# Patient Record
Sex: FEMALE | Race: BLACK OR AFRICAN AMERICAN | NOT HISPANIC OR LATINO | Employment: OTHER | URBAN - METROPOLITAN AREA
[De-identification: names, ages, dates, MRNs, and addresses within clinical notes are randomized per-mention and may not be internally consistent; named-entity substitution may affect disease eponyms.]

---

## 2020-08-03 ENCOUNTER — TELEPHONE (OUTPATIENT)
Dept: PRIMARY CARE CLINIC | Facility: CLINIC | Age: 65
End: 2020-08-03

## 2020-08-03 NOTE — TELEPHONE ENCOUNTER
----- Message from Astrid Jo sent at 8/3/2020  1:00 PM CDT -----  Type:  Needs Medical Advice    Who Called:  Pt  Olivia   Symptoms (please be specific):   Pt is a newpt to Dr Fox/she would like to est care//would like to know being she is a new pt can she have a virtual appt  How long has patient had these symptoms:    Pharmacy name and phone #:    Would the patient rather a call back or a response via [facility]?  Call back  Best Call Back Number:  [phone]  Additional Information:  please call to inform//thanks//dangelo

## 2020-09-01 ENCOUNTER — OFFICE VISIT (OUTPATIENT)
Dept: PRIMARY CARE CLINIC | Facility: CLINIC | Age: 65
End: 2020-09-01
Payer: MEDICARE

## 2020-09-01 ENCOUNTER — HOSPITAL ENCOUNTER (OUTPATIENT)
Dept: RADIOLOGY | Facility: HOSPITAL | Age: 65
Discharge: HOME | End: 2020-09-01
Attending: FAMILY MEDICINE
Payer: MEDICARE

## 2020-09-01 VITALS
TEMPERATURE: 98 F | DIASTOLIC BLOOD PRESSURE: 70 MMHG | BODY MASS INDEX: 57.38 KG/M2 | SYSTOLIC BLOOD PRESSURE: 160 MMHG | OXYGEN SATURATION: 96 % | HEART RATE: 79 BPM | WEIGHT: 293 LBS

## 2020-09-01 DIAGNOSIS — M79.602 LEFT ARM PAIN: ICD-10-CM

## 2020-09-01 DIAGNOSIS — R06.02 SOB (SHORTNESS OF BREATH) ON EXERTION: Primary | ICD-10-CM

## 2020-09-01 DIAGNOSIS — Z86.73 HISTORY OF CVA (CEREBROVASCULAR ACCIDENT): ICD-10-CM

## 2020-09-01 DIAGNOSIS — M25.561 CHRONIC PAIN OF BOTH KNEES: ICD-10-CM

## 2020-09-01 DIAGNOSIS — Z85.41 HISTORY OF CERVICAL CANCER: ICD-10-CM

## 2020-09-01 DIAGNOSIS — M51.36 DDD (DEGENERATIVE DISC DISEASE), LUMBAR: ICD-10-CM

## 2020-09-01 DIAGNOSIS — F43.21 GRIEF REACTION: ICD-10-CM

## 2020-09-01 DIAGNOSIS — M25.562 CHRONIC PAIN OF BOTH KNEES: ICD-10-CM

## 2020-09-01 DIAGNOSIS — G47.00 INSOMNIA, UNSPECIFIED TYPE: ICD-10-CM

## 2020-09-01 DIAGNOSIS — E55.9 VITAMIN D DEFICIENCY: ICD-10-CM

## 2020-09-01 DIAGNOSIS — I10 HTN (HYPERTENSION), BENIGN: ICD-10-CM

## 2020-09-01 DIAGNOSIS — G89.29 CHRONIC PAIN OF BOTH KNEES: ICD-10-CM

## 2020-09-01 DIAGNOSIS — R73.09 ABNORMAL GLUCOSE: ICD-10-CM

## 2020-09-01 DIAGNOSIS — J44.9 CHRONIC OBSTRUCTIVE PULMONARY DISEASE, UNSPECIFIED COPD TYPE: ICD-10-CM

## 2020-09-01 DIAGNOSIS — E66.01 MORBIDLY OBESE: ICD-10-CM

## 2020-09-01 PROBLEM — R09.02 HYPOXIA: Status: ACTIVE | Noted: 2018-09-21

## 2020-09-01 PROBLEM — M62.82 NON-TRAUMATIC RHABDOMYOLYSIS: Status: ACTIVE | Noted: 2018-09-21

## 2020-09-01 PROCEDURE — 73090 XR FOREARM LEFT: ICD-10-PCS | Mod: 26,HCNC,LT, | Performed by: RADIOLOGY

## 2020-09-01 PROCEDURE — 3078F PR MOST RECENT DIASTOLIC BLOOD PRESSURE < 80 MM HG: ICD-10-PCS | Mod: HCNC,CPTII,S$GLB, | Performed by: FAMILY MEDICINE

## 2020-09-01 PROCEDURE — 3008F PR BODY MASS INDEX (BMI) DOCUMENTED: ICD-10-PCS | Mod: HCNC,CPTII,S$GLB, | Performed by: FAMILY MEDICINE

## 2020-09-01 PROCEDURE — 99999 PR PBB SHADOW E&M-EST. PATIENT-LVL V: ICD-10-PCS | Mod: PBBFAC,HCNC,, | Performed by: FAMILY MEDICINE

## 2020-09-01 PROCEDURE — 73090 X-RAY EXAM OF FOREARM: CPT | Mod: TC,HCNC,FY,PO,LT

## 2020-09-01 PROCEDURE — 99999 PR PBB SHADOW E&M-EST. PATIENT-LVL V: CPT | Mod: PBBFAC,HCNC,, | Performed by: FAMILY MEDICINE

## 2020-09-01 PROCEDURE — 3078F DIAST BP <80 MM HG: CPT | Mod: HCNC,CPTII,S$GLB, | Performed by: FAMILY MEDICINE

## 2020-09-01 PROCEDURE — 3008F BODY MASS INDEX DOCD: CPT | Mod: HCNC,CPTII,S$GLB, | Performed by: FAMILY MEDICINE

## 2020-09-01 PROCEDURE — 3077F SYST BP >= 140 MM HG: CPT | Mod: HCNC,CPTII,S$GLB, | Performed by: FAMILY MEDICINE

## 2020-09-01 PROCEDURE — 99205 OFFICE O/P NEW HI 60 MIN: CPT | Mod: HCNC,S$GLB,, | Performed by: FAMILY MEDICINE

## 2020-09-01 PROCEDURE — 73090 X-RAY EXAM OF FOREARM: CPT | Mod: 26,HCNC,LT, | Performed by: RADIOLOGY

## 2020-09-01 PROCEDURE — 3077F PR MOST RECENT SYSTOLIC BLOOD PRESSURE >= 140 MM HG: ICD-10-PCS | Mod: HCNC,CPTII,S$GLB, | Performed by: FAMILY MEDICINE

## 2020-09-01 PROCEDURE — 99205 PR OFFICE/OUTPT VISIT, NEW, LEVL V, 60-74 MIN: ICD-10-PCS | Mod: HCNC,S$GLB,, | Performed by: FAMILY MEDICINE

## 2020-09-01 PROCEDURE — 99499 UNLISTED E&M SERVICE: CPT | Mod: S$GLB,,, | Performed by: FAMILY MEDICINE

## 2020-09-01 PROCEDURE — 99499 RISK ADDL DX/OHS AUDIT: ICD-10-PCS | Mod: S$GLB,,, | Performed by: FAMILY MEDICINE

## 2020-09-01 RX ORDER — METHOCARBAMOL 500 MG/1
1000 TABLET, FILM COATED ORAL
COMMUNITY
Start: 2019-05-30 | End: 2020-09-01 | Stop reason: SDUPTHER

## 2020-09-01 RX ORDER — HYDROCHLOROTHIAZIDE 12.5 MG/1
12.5 CAPSULE ORAL DAILY
Qty: 90 CAPSULE | Refills: 3 | Status: SHIPPED | OUTPATIENT
Start: 2020-09-01

## 2020-09-01 RX ORDER — ACETAMINOPHEN AND CODEINE PHOSPHATE 300; 30 MG/1; MG/1
1 TABLET ORAL
COMMUNITY
Start: 2019-08-18 | End: 2020-09-01

## 2020-09-01 RX ORDER — LOSARTAN POTASSIUM 50 MG/1
50 TABLET ORAL
COMMUNITY
Start: 2019-08-18 | End: 2020-09-01

## 2020-09-01 RX ORDER — TRAMADOL HYDROCHLORIDE 50 MG/1
50 TABLET ORAL 2 TIMES DAILY
COMMUNITY
Start: 2020-07-24 | End: 2020-09-01 | Stop reason: SDUPTHER

## 2020-09-01 RX ORDER — AMLODIPINE BESYLATE 5 MG/1
1 TABLET ORAL
COMMUNITY
Start: 2019-08-05 | End: 2020-09-01 | Stop reason: SDUPTHER

## 2020-09-01 RX ORDER — METHOCARBAMOL 750 MG/1
750 TABLET, FILM COATED ORAL EVERY 8 HOURS PRN
COMMUNITY
Start: 2020-08-13

## 2020-09-01 RX ORDER — NAPROXEN 500 MG/1
500 TABLET ORAL
COMMUNITY
Start: 2019-10-30 | End: 2020-09-01

## 2020-09-01 RX ORDER — PREDNISONE 20 MG/1
TABLET ORAL
COMMUNITY
Start: 2019-10-30 | End: 2020-09-01

## 2020-09-01 RX ORDER — EPINEPHRINE 0.3 MG/.3ML
0.3 INJECTION SUBCUTANEOUS
COMMUNITY
Start: 2018-12-13 | End: 2020-09-10

## 2020-09-01 RX ORDER — FUROSEMIDE 20 MG/1
20 TABLET ORAL 2 TIMES DAILY PRN
Qty: 180 TABLET | Refills: 3 | Status: SHIPPED | OUTPATIENT
Start: 2020-09-01

## 2020-09-01 RX ORDER — LOSARTAN POTASSIUM 100 MG/1
100 TABLET ORAL DAILY
Qty: 90 TABLET | Refills: 3 | Status: SHIPPED | OUTPATIENT
Start: 2020-09-01

## 2020-09-01 RX ORDER — METHOCARBAMOL 500 MG/1
TABLET, FILM COATED ORAL
COMMUNITY
Start: 2020-07-24 | End: 2020-09-01 | Stop reason: SDUPTHER

## 2020-09-01 RX ORDER — ERGOCALCIFEROL 1.25 MG/1
50000 CAPSULE ORAL
COMMUNITY
Start: 2019-08-23 | End: 2020-09-02 | Stop reason: SDUPTHER

## 2020-09-01 RX ORDER — ALBUTEROL SULFATE 0.83 MG/ML
3 SOLUTION RESPIRATORY (INHALATION)
COMMUNITY
Start: 2017-11-28

## 2020-09-01 RX ORDER — CLOPIDOGREL BISULFATE 75 MG/1
75 TABLET ORAL
COMMUNITY
Start: 2019-08-18 | End: 2020-09-01

## 2020-09-01 RX ORDER — HYDROCHLOROTHIAZIDE 12.5 MG/1
12.5 CAPSULE ORAL
COMMUNITY
Start: 2019-08-18 | End: 2020-09-01 | Stop reason: SDUPTHER

## 2020-09-01 RX ORDER — AMLODIPINE BESYLATE 5 MG/1
5 TABLET ORAL DAILY
Qty: 90 TABLET | Refills: 3 | Status: SHIPPED | OUTPATIENT
Start: 2020-09-01

## 2020-09-01 RX ORDER — TRAMADOL HYDROCHLORIDE 50 MG/1
50 TABLET ORAL
COMMUNITY
Start: 2020-01-07 | End: 2020-09-10 | Stop reason: SDUPTHER

## 2020-09-01 RX ORDER — DIPHENHYDRAMINE HCL 25 MG
25 CAPSULE ORAL
COMMUNITY
Start: 2018-12-13 | End: 2020-09-10

## 2020-09-01 RX ORDER — BENZONATATE 100 MG/1
100 CAPSULE ORAL
COMMUNITY
Start: 2020-01-07 | End: 2020-09-01

## 2020-09-01 RX ORDER — PENICILLIN V POTASSIUM 500 MG/1
500 TABLET, FILM COATED ORAL
COMMUNITY
Start: 2020-01-07 | End: 2020-09-01

## 2020-09-01 RX ORDER — IPRATROPIUM BROMIDE AND ALBUTEROL SULFATE 2.5; .5 MG/3ML; MG/3ML
3 SOLUTION RESPIRATORY (INHALATION) EVERY 4 HOURS PRN
Qty: 1 BOX | Refills: 11 | Status: SHIPPED | OUTPATIENT
Start: 2020-09-01
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
## 2020-09-02 ENCOUNTER — PATIENT MESSAGE (OUTPATIENT)
Dept: PRIMARY CARE CLINIC | Facility: CLINIC | Age: 65
End: 2020-09-02

## 2020-09-02 DIAGNOSIS — E78.5 HYPERLIPIDEMIA, UNSPECIFIED HYPERLIPIDEMIA TYPE: Primary | ICD-10-CM

## 2020-09-02 DIAGNOSIS — E55.9 VITAMIN D DEFICIENCY: ICD-10-CM

## 2020-09-02 DIAGNOSIS — E88.819 INSULIN RESISTANCE: ICD-10-CM

## 2020-09-02 RX ORDER — ERGOCALCIFEROL 1.25 MG/1
50000 CAPSULE ORAL
Qty: 12 CAPSULE | Refills: 3 | Status: SHIPPED | OUTPATIENT
Start: 2020-09-02

## 2020-09-02 RX ORDER — METFORMIN HYDROCHLORIDE 500 MG/1
TABLET, EXTENDED RELEASE ORAL
Qty: 120 TABLET | Refills: 2 | Status: SHIPPED | OUTPATIENT
Start: 2020-09-02

## 2020-09-02 RX ORDER — ROSUVASTATIN CALCIUM 10 MG/1
10 TABLET, COATED ORAL DAILY
Qty: 90 TABLET | Refills: 3 | Status: SHIPPED | OUTPATIENT
Start: 2020-09-02 | End: 2021-09-02

## 2020-09-02 NOTE — TELEPHONE ENCOUNTER
Called patient in ref to labs results patient had no concerning questions other than her results from her Xray on 09/01/2020.

## 2020-09-02 NOTE — TELEPHONE ENCOUNTER
Vitamin D levels are low. Needs to start weekly supplement. Please inform.     The 10-year ASCVD risk score (Carmen GARTH Jr., et al., 2013) is: 11.5%    Values used to calculate the score:      Age: 65 years      Sex: Female      Is Non- : Yes      Diabetic: No      Tobacco smoker: No      Systolic Blood Pressure: 160 mmHg      Is BP treated: Yes      HDL Cholesterol: 42 mg/dL      Total Cholesterol: 141 mg/dL    Needs to be on cholesterol lowering medication. Will send in statin for her to begin 1 tablet at night, generic crestor 10mg.     Sugar levels no evidence of diabetes, but does have insulin resistance so needs to limit sweets and carbs. Can start metformin also to help with this as well.     Otherwise  bood counts, kidney functions, liver functions, and thyroid functions are within goal ranges. Please continue on your current medications.     Repeat labs 1-2 days prior to appt with ce in 1 month.     Please let me know if you have further questions.   Ce Fox MD

## 2020-09-10 ENCOUNTER — OFFICE VISIT (OUTPATIENT)
Dept: PAIN MEDICINE | Facility: CLINIC | Age: 65
End: 2020-09-10
Payer: MEDICARE

## 2020-09-10 ENCOUNTER — OFFICE VISIT (OUTPATIENT)
Dept: ORTHOPEDICS | Facility: CLINIC | Age: 65
End: 2020-09-10
Payer: MEDICARE

## 2020-09-10 VITALS
BODY MASS INDEX: 43.4 KG/M2 | DIASTOLIC BLOOD PRESSURE: 83 MMHG | WEIGHT: 293 LBS | SYSTOLIC BLOOD PRESSURE: 149 MMHG | HEART RATE: 89 BPM | HEIGHT: 69 IN

## 2020-09-10 VITALS
DIASTOLIC BLOOD PRESSURE: 80 MMHG | SYSTOLIC BLOOD PRESSURE: 151 MMHG | WEIGHT: 293 LBS | HEIGHT: 69 IN | HEART RATE: 85 BPM | BODY MASS INDEX: 43.4 KG/M2

## 2020-09-10 DIAGNOSIS — M25.532 LEFT WRIST PAIN: Primary | ICD-10-CM

## 2020-09-10 DIAGNOSIS — M25.562 CHRONIC PAIN OF BOTH KNEES: ICD-10-CM

## 2020-09-10 DIAGNOSIS — G89.29 CHRONIC PAIN OF BOTH KNEES: ICD-10-CM

## 2020-09-10 DIAGNOSIS — M79.602 LEFT ARM PAIN: ICD-10-CM

## 2020-09-10 DIAGNOSIS — E66.01 MORBID OBESITY WITH BMI OF 50.0-59.9, ADULT: ICD-10-CM

## 2020-09-10 DIAGNOSIS — M25.561 CHRONIC PAIN OF BOTH KNEES: ICD-10-CM

## 2020-09-10 DIAGNOSIS — M46.1 SACROILIITIS: Primary | ICD-10-CM

## 2020-09-10 DIAGNOSIS — M51.36 DDD (DEGENERATIVE DISC DISEASE), LUMBAR: ICD-10-CM

## 2020-09-10 PROCEDURE — 3008F BODY MASS INDEX DOCD: CPT | Mod: HCNC,CPTII,S$GLB, | Performed by: PHYSICAL MEDICINE & REHABILITATION

## 2020-09-10 PROCEDURE — 99204 OFFICE O/P NEW MOD 45 MIN: CPT | Mod: HCNC,S$GLB,, | Performed by: PHYSICAL MEDICINE & REHABILITATION

## 2020-09-10 PROCEDURE — 99999 PR PBB SHADOW E&M-EST. PATIENT-LVL V: ICD-10-PCS | Mod: PBBFAC,HCNC,, | Performed by: PHYSICAL MEDICINE & REHABILITATION

## 2020-09-10 PROCEDURE — 99499 RISK ADDL DX/OHS AUDIT: ICD-10-PCS | Mod: S$GLB,,, | Performed by: PHYSICAL MEDICINE & REHABILITATION

## 2020-09-10 PROCEDURE — 3079F DIAST BP 80-89 MM HG: CPT | Mod: HCNC,CPTII,S$GLB, | Performed by: PHYSICAL MEDICINE & REHABILITATION

## 2020-09-10 PROCEDURE — 3079F PR MOST RECENT DIASTOLIC BLOOD PRESSURE 80-89 MM HG: ICD-10-PCS | Mod: HCNC,CPTII,S$GLB, | Performed by: PHYSICAL MEDICINE & REHABILITATION

## 2020-09-10 PROCEDURE — 99499 UNLISTED E&M SERVICE: CPT | Mod: S$GLB,,, | Performed by: PHYSICAL MEDICINE & REHABILITATION

## 2020-09-10 PROCEDURE — 3008F PR BODY MASS INDEX (BMI) DOCUMENTED: ICD-10-PCS | Mod: HCNC,CPTII,S$GLB, | Performed by: PHYSICAL MEDICINE & REHABILITATION

## 2020-09-10 PROCEDURE — 3077F SYST BP >= 140 MM HG: CPT | Mod: HCNC,CPTII,S$GLB, | Performed by: PHYSICAL MEDICINE & REHABILITATION

## 2020-09-10 PROCEDURE — 99204 PR OFFICE/OUTPT VISIT, NEW, LEVL IV, 45-59 MIN: ICD-10-PCS | Mod: HCNC,S$GLB,, | Performed by: PHYSICAL MEDICINE & REHABILITATION

## 2020-09-10 PROCEDURE — 99999 PR PBB SHADOW E&M-EST. PATIENT-LVL V: CPT | Mod: PBBFAC,HCNC,, | Performed by: PHYSICAL MEDICINE & REHABILITATION

## 2020-09-10 PROCEDURE — 99999 PR PBB SHADOW E&M-EST. PATIENT-LVL III: ICD-10-PCS | Mod: PBBFAC,HCNC,, | Performed by: PHYSICAL MEDICINE & REHABILITATION

## 2020-09-10 PROCEDURE — 3077F PR MOST RECENT SYSTOLIC BLOOD PRESSURE >= 140 MM HG: ICD-10-PCS | Mod: HCNC,CPTII,S$GLB, | Performed by: PHYSICAL MEDICINE & REHABILITATION

## 2020-09-10 PROCEDURE — 99999 PR PBB SHADOW E&M-EST. PATIENT-LVL III: CPT | Mod: PBBFAC,HCNC,, | Performed by: PHYSICAL MEDICINE & REHABILITATION

## 2020-09-10 RX ORDER — TRAMADOL HYDROCHLORIDE 50 MG/1
TABLET ORAL
Qty: 21 TABLET | Refills: 0 | Status: SHIPPED | OUTPATIENT
Start: 2020-09-10 | End: 2020-09-14 | Stop reason: SDUPTHER

## 2020-09-10 RX ORDER — IPRATROPIUM BROMIDE 0.5 MG/2.5ML
SOLUTION RESPIRATORY (INHALATION)
COMMUNITY

## 2020-09-10 NOTE — PROGRESS NOTES
SPORTS MEDICINE / PM&R New Patient Visit :    Referring Physician: Jolanta Fox MD    Chief Complaint   Patient presents with    Left Forearm - Pain       HPI: This is a 65 y.o.  female being seen in clinic today for evaluation of Pain of the Left Forearm   The problem first began August 2020 when she slipped in the tub and fell landing on her left arm.  She feels sharp, intermittent and pain with movement pain around her left wrist.The symptoms are worsening. She has tried ice and ibuprofen without improvement. She has not tried therapy. She had normal left forearm x-ray, but mostly points to left thumb and lateral wrist as area of pain. She also was referred to ortho for consideration of knee joint replacement. She's had ongoing knee pain since at least 2013 when x-ray revealed medial joint OA. She was considering joint replacement then, but had to move to [place] until recently moving home.    History obtained from patient.    Past family, medical, social, surgical history, and vital signs reviewed in chart.    Review of Systems   Constitutional: Negative for chills, fever and weight loss.   HENT: Negative for hearing loss and sore throat.    Eyes: Negative for blurred vision, photophobia and pain.   Respiratory: Negative for shortness of breath.    Cardiovascular: Negative for chest pain.   Gastrointestinal: Negative for abdominal pain.   Genitourinary: Negative for dysuria.   Skin: Negative for rash.   Neurological: Negative for tingling and headaches.   Endo/Heme/Allergies: Does not bruise/bleed easily.   Psychiatric/Behavioral: Negative for depression.       General    Nursing note and vitals reviewed.  Constitutional: She is oriented to person, place, and time. She appears well-developed and well-nourished.   HENT:   Head: Normocephalic and atraumatic.   Eyes: Conjunctivae and EOM are normal. Pupils are equal, round, and reactive to light.   Neck: Neck supple.   Cardiovascular: Intact distal pulses.     Pulmonary/Chest: Effort normal. No respiratory distress.   Abdominal: She exhibits no distension.   Neurological: She is alert and oriented to person, place, and time. She has normal reflexes.   Psychiatric: She has a normal mood and affect.             Right Hand/Wrist Exam     Inspection   Scars: Wrist - absent   Effusion: Wrist - absent   Deformity: Wrist - deformity Hand -  deformity    Range of Motion     Wrist   Extension: normal   Flexion: normal   Pronation: normal   Supination: normal     Tests   Phalens Sign: negative  Tinel's sign (median nerve): negative  Carpal Tunnel Compression Test: negative  Cubital Tunnel Compression Test: negative    Atrophy   Thenar:  negative  Intrinsic:  negative    Other     Neuorologic Exam    Median Distribution: normal  Ulnar Distribution: normal  Radial Distribution: normal    Comments:  Normal OK sign. Negative Froment's. Negative Dalal's.         Left Hand/Wrist Exam     Inspection   Scars: Wrist - absent   Effusion: Wrist - present (very mild swelling over radial side of wrist)   Bruising: Wrist - present (over dorsal lateral hand)   Deformity: Wrist - absent Hand -  absent    Tenderness   The patient is tender to palpation of the radial area.     Range of Motion     Wrist   Extension: abnormal   Flexion: abnormal   Pronation: normal   Supination: normal     Tests   Phalens Sign: negative  Tinel's sign (median nerve): negative  Carpal Tunnel Compression Test: negative  Cubital Tunnel Compression Test: negative    Atrophy  Thenar:  Negative  Intrinsic: negative    Other     Sensory Exam  Median Distribution: normal  Ulnar Distribution: normal  Radial Distribution: normal    Comments:  Normal OK sign. Negative Froment's. Negative Dalal's.       Right Elbow Exam     Tests   Tinel's sign (cubital tunnel): negative      Left Elbow Exam     Tests   Tinel's sign (cubital tunnel): negative        Muscle Strength   Right Upper Extremity   Wrist extension: 5/5   Wrist  flexion: 5/5   : 5/5   Thumb - APB: 5/5  Left Upper Extremity  Wrist extension: 4/5   Wrist flexion: 4/5   :  5/5   Thumb - APB: 5/5    Vascular Exam       Capillary Refill  Right Hand: normal capillary refill  Left Hand: normal capillary refill        IMPRESSION/PLAN: This is a 65 y.o.  female with:    Left wrist pain  -     X-Ray Wrist Complete Left; Future; Expected date: 09/10/2020    Left arm pain  Comments:  -new since fall 4 days ago x-rays today refer to Orthopedics  Orders:  -     Ambulatory referral/consult to Orthopedics    Chronic pain of both knees  Comments:  Refer to Orthopedics known issues with degenerative disc currently on tramadol  Orders:  -     Ambulatory referral/consult to Orthopedics        The findings were discussed with Olivia PANDEY in detail. Her pain is mostly in hand and wrist, but previous x-rays were just of forearm. Today we'll get wrist x-rays for better view of area of pain. If negative for fracture, will try hand therapy. If positive for fracture, will send to hand surgeon. Will also fit her for left wrist splint today to wear at night and occasionally during the day. For the severe knee pain and arthritis, she'd like to consider knee replacement. I'll refer to our joint surgeon, Dr. Jurado. She can continue meds from her PCP for now.  She was provided with this plan in writing. All of her questions were answered. She will follow up with me depending on x-rays and treatment in 4 weeks.     Linda Cervantes M.D.  Sports Medicine

## 2020-09-10 NOTE — PROGRESS NOTES
New Patient Chronic Pain Note (Initial Visit)    Referring Physician: Jolanta Fox MD    PCP: Jolanta Fox MD    Chief Complaint:   Chief Complaint   Patient presents with    Low-back Pain        SUBJECTIVE:    Olivia Antonio is a 65 y.o. female who presents to the clinic for the evaluation of  chronic low back and knee  pain.  She was referred by primary care provider for further evaluation and  management of this pain.  Of note, patient has past medical history of CVA, COPD, hypertension, cervical cancer, morbid obesity, chronic pain syndrome,  and multiple other medical comorbidities.  She had a slip and fall in the tub where she landed on her left arm recently.  X-rays of the forearm more performed which were negative for any acute process.  She denies any other major injuries sustained during this fall.  She has been dealing with chronic back pain for several years.  She reports that her symptoms have been worsening.The pain is located in the right lumbosacral area and radiates to the right hip and buttock.  The pain is described as Throbbing, burning and is rated as 6/10. The pain is rated with a score of  5/10 on the BEST day and a score of 10/10 on the WORST day.  Symptoms interfere with daily activity and sleeping. The pain is exacerbated by walking, prolonged sitting, prolonged standing.  The pain is mitigated by elevating her legs. The patient reports spending 4-6 hours per day reclining. The patient reports 5-7 hours of uninterrupted sleep per night.    Patient denies night fever/night sweats, urinary incontinence, bowel incontinence, significant weight loss, significant motor weakness and loss of sensations.  She uses a single-point cane for assisted ambulation.  She has trouble maneuvering obstacles at times which is what caused her fall in the tub.    Pain Disability Index Review:   No flowsheet data found.    Non-Pharmacologic Treatments:  Physical Therapy/Home Exercise: yes  Ice/Heat:yes  TENS:  no  Acupuncture: no  Massage: no  Chiropractic: no    Other: no      Pain Medications:  - Opioids: Tramadol, Tylenol #3  - Adjuvant Medications: Robaxin  - Anti-Coagulants: None     report:  Reviewed and consistent with medication use as prescribed.        Pain Procedures:   Possible lumbar epidural steroid injection      Imaging:    x-ray lumbar spine 06/25/2013:  Spinal alignment is anatomic.  There is multilevel degenerative vertebral endplate spurring with bilateral facet arthropathy at L4-5 and L5-S1.  Positioning limits evaluation of lumbosacral junction with suspected mild to moderate disk space   narrowing at L5-S1.  Pedicles are intact.  No compression fracture or subluxation.     x-ray bilateral knees 04/23/2013:  Standing AP images as well as lateral and sunrise images of both knees   have been submitted.  There is narrowing of the medial compartment of the   right femorotibial joint with associated periarticular spurring.  Mild   periarticular spurring is also demonstrated at the left femorotibial and   both patellofemoral joints.  No acute fractures or significant focal bony   lesions are identified.  No significant effusion is appreciated on either   side.     Past Medical History:   Diagnosis Date    Angina pectoris syndrome     Asthma     Cervical cancer     COPD (chronic obstructive pulmonary disease)     History of smoking     Hypertension     Lumbago     Morbidly obese     Osteoarthritis of both knees     Vitamin D deficiency 8/16/2019     Past Surgical History:   Procedure Laterality Date    CHOLECYSTECTOMY      HYSTERECTOMY       Social History     Socioeconomic History    Marital status: Single     Spouse name: Not on file    Number of children: Not on file    Years of education: Not on file    Highest education level: Not on file   Occupational History    Not on file   Social Needs    Financial resource strain: Not on file    Food insecurity     Worry: Not on file      Inability: Not on file    Transportation needs     Medical: Not on file     Non-medical: Not on file   Tobacco Use    Smoking status: Former Smoker     Packs/day: 2.00     Years: 30.00     Pack years: 60.00     Quit date: 1998     Years since quittin.2    Smokeless tobacco: Never Used   Substance and Sexual Activity    Alcohol use: No    Drug use: No    Sexual activity: Not on file   Lifestyle    Physical activity     Days per week: Not on file     Minutes per session: Not on file    Stress: Not on file   Relationships    Social connections     Talks on phone: Not on file     Gets together: Not on file     Attends Jewish service: Not on file     Active member of club or organization: Not on file     Attends meetings of clubs or organizations: Not on file     Relationship status: Not on file   Other Topics Concern    Not on file   Social History Narrative    Not on file     Family History   Problem Relation Age of Onset    Heart disease Mother     Hypertension Mother     Heart disease Father     Hypertension Father     Glaucoma Paternal Aunt     Glaucoma Paternal Uncle        Review of patient's allergies indicates:   Allergen Reactions    Ace inhibitors Hives and Swelling    Methylprednisolone sodium succ Shortness Of Breath, Rash and Nausea And Vomiting    Aspirin Hives    Lyrica [pregabalin] Swelling    Compazine [prochlorperazine edisylate] Swelling    Prochlorperazine        Current Outpatient Medications   Medication Sig    albuterol (PROVENTIL) 2.5 mg /3 mL (0.083 %) nebulizer solution Inhale 3 mLs into the lungs.    albuterol 90 mcg/actuation HFAA Inhale 1 puff into the lungs every 6 (six) hours as needed. 2 HFA Aerosol Inhaler Inhalation Every 4-6 hours    albuterol-ipratropium (DUO-NEB) 2.5 mg-0.5 mg/3 mL nebulizer solution Take 3 mLs by nebulization every 4 (four) hours as needed for Wheezing or Shortness of Breath. Rescue    amLODIPine (NORVASC) 5 MG tablet Take 1  tablet (5 mg total) by mouth once daily.    desonide (DESOWEN) 0.05 % lotion Apply topically 2 (two) times daily.    ergocalciferol (ERGOCALCIFEROL) 50,000 unit Cap Take 1 capsule (50,000 Units total) by mouth every 7 days.    furosemide (LASIX) 20 MG tablet Take 1 tablet (20 mg total) by mouth 2 (two) times daily as needed.    hydroCHLOROthiazide (MICROZIDE) 12.5 mg capsule Take 1 capsule (12.5 mg total) by mouth once daily.    ipratropium (ATROVENT) 0.02 % nebulizer solution ipratropium bromide 0.02 % solution for inhalation   Atrovent 0.5 mg solution nebulized on scene. Time administered: 1152    losartan (COZAAR) 100 MG tablet Take 1 tablet (100 mg total) by mouth once daily.    metFORMIN (GLUCOPHAGE-XR) 500 MG ER 24hr tablet 1 tab po daily x 1wk, 2 tab po daily x 1wk, 3 tab po daily x 1wk, 4 tab po daily    methocarbamoL (ROBAXIN) 750 MG Tab Take 750 mg by mouth every 8 (eight) hours as needed.    rosuvastatin (CRESTOR) 10 MG tablet Take 1 tablet (10 mg total) by mouth once daily.    dorzolamide (TRUSOPT) 2 % ophthalmic solution Place 1 drop into the left eye 3 (three) times daily.    latanoprost 0.005 % ophthalmic solution Place 1 drop into both eyes every evening.    traMADoL (ULTRAM) 50 mg tablet Take 1/2 to 1 tab PO QD to TID PRN pain.     No current facility-administered medications for this visit.        Review of Systems     GENERAL:  No weight loss, malaise or fevers.  HEENT:   No recent changes in vision or hearing  NECK:  Negative for lumps, no difficulty with swallowing.  RESPIRATORY:  Negative for cough, wheezing or shortness of breath, patient denies any recent URI.  CARDIOVASCULAR:  Negative for chest pain, leg swelling or palpitations.  GI:  Negative for abdominal discomfort, blood in stools or black stools or change in bowel habits.  MUSCULOSKELETAL:  See HPI.  SKIN:  Negative for lesions, rash, and itching.  PSYCH:  No mood disorder or recent psychosocial stressors.  Patients sleep  "is not disturbed secondary to pain.  HEMATOLOGY/LYMPHOLOGY:  Negative for prolonged bleeding, bruising easily or swollen nodes.  Patient is not currently taking any anti-coagulants  NEURO:   No history of headaches, syncope, paralysis, seizures or tremors.  All other reviewed and negative other than HPI.    OBJECTIVE:    BP (!) 151/80   Pulse 85   Ht 5' 9" (1.753 m)   Wt (!) 175.6 kg (387 lb 2 oz)   LMP  (LMP Unknown)   BMI 57.17 kg/m²         Physical Exam    GENERAL: Well appearing, in no acute distress, alert and oriented x3.  Morbidly obese  PSYCH:  Mood and affect appropriate.  SKIN: Skin color, texture, turgor normal, no rashes or lesions.  HEAD/FACE:  Normocephalic, atraumatic. Cranial nerves grossly intact.  CV: RRR with palpation of the radial artery.  PULM: No evidence of respiratory difficulty, symmetric chest rise.  GI:  Soft and non-tender.  BACK: Straight leg raising in the sitting and supine positions is negative to radicular pain.  Mild-to-moderate pain to palpation over the facet joints of the lumbar spine and lumbar paraspinals.  Limited range of motion secondary to pain reproduction.  EXTREMITIES: Peripheral joint ROM is full and pain free without obvious instability or laxity in all four extremities. No deformities, edema, or skin discoloration. Good capillary refill.  MUSCULOSKELETAL:  Hip and knee provocative maneuvers are negative.  There is moderate pain with palpation over the sacroiliac joint on the right.  FABERs test is positive on the right.  FADIRs test is equivocal.   Bilateral upper and lower extremity strength is normal and symmetric.  No atrophy or tone abnormalities are noted.  NEURO: Bilateral upper and lower extremity coordination and muscle stretch reflexes are physiologic and symmetric.  Plantar response are downgoing. No clonus.  No loss of sensation is noted.  GAIT:  Slow, antalgic, using single-point cane for assisted ambulation.      LABS:  Lab Results   Component " Value Date    WBC 8.16 09/01/2020    HGB 13.4 09/01/2020    HCT 43.3 09/01/2020    MCV 97 09/01/2020     09/01/2020       CMP  Sodium   Date Value Ref Range Status   09/01/2020 142 136 - 145 mmol/L Final     Potassium   Date Value Ref Range Status   09/01/2020 3.9 3.5 - 5.1 mmol/L Final     Chloride   Date Value Ref Range Status   09/01/2020 107 95 - 110 mmol/L Final     CO2   Date Value Ref Range Status   09/01/2020 23 23 - 29 mmol/L Final     Glucose   Date Value Ref Range Status   09/01/2020 79 70 - 110 mg/dL Final     BUN, Bld   Date Value Ref Range Status   09/01/2020 9 8 - 23 mg/dL Final     Creatinine   Date Value Ref Range Status   09/01/2020 0.7 0.5 - 1.4 mg/dL Final     Calcium   Date Value Ref Range Status   09/01/2020 9.1 8.7 - 10.5 mg/dL Final     Total Protein   Date Value Ref Range Status   09/01/2020 7.5 6.0 - 8.4 g/dL Final     Albumin   Date Value Ref Range Status   09/01/2020 3.7 3.5 - 5.2 g/dL Final     Total Bilirubin   Date Value Ref Range Status   09/01/2020 0.5 0.1 - 1.0 mg/dL Final     Comment:     For infants and newborns, interpretation of results should be based  on gestational age, weight and in agreement with clinical  observations.  Premature Infant recommended reference ranges:  Up to 24 hours.............<8.0 mg/dL  Up to 48 hours............<12.0 mg/dL  3-5 days..................<15.0 mg/dL  6-29 days.................<15.0 mg/dL       Alkaline Phosphatase   Date Value Ref Range Status   09/01/2020 108 55 - 135 U/L Final     AST   Date Value Ref Range Status   09/01/2020 16 10 - 40 U/L Final     ALT   Date Value Ref Range Status   09/01/2020 15 10 - 44 U/L Final     Anion Gap   Date Value Ref Range Status   09/01/2020 12 8 - 16 mmol/L Final     eGFR if    Date Value Ref Range Status   09/01/2020 >60.0 >60 mL/min/1.73 m^2 Final     eGFR if non    Date Value Ref Range Status   09/01/2020 >60.0 >60 mL/min/1.73 m^2 Final     Comment:     Calculation  used to obtain the estimated glomerular filtration  rate (eGFR) is the CKD-EPI equation.          Lab Results   Component Value Date    HGBA1C 5.4 09/01/2020             ASSESSMENT: 65 y.o. year old female with lower back pain, consistent with     1. Sacroiliitis  IR SI Joint Injection w/Imaging    Case Request-RAD/Other Procedure Area: Right SIJ Injection   2. Chronic pain of both knees  Ambulatory referral/consult to Pain Clinic    Refer to Orthopedics known issues with degenerative disc currently on tramadol   3. DDD (degenerative disc disease), lumbar  Ambulatory referral/consult to Pain Clinic    With known issues refer to Orthopedics and Pain Management on tramadol   4. Morbid obesity with BMI of 50.0-59.9, adult           PLAN:   - Interventions: Scheduled for right-sided sacroiliac joint injection under fluoroscopy for diagnostic and therapeutic purposes.. Explained the risks and benefits of the procedure in detail with the patient today in clinic along with alternative treatment options, and the patient elected to pursue the intervention at this time.  BMI of over 50, will perform without IV sedation.    - Anticoagulation use: no     - Medications: I have stressed the importance of physical activity and a home exercise plan to help with pain and improve health., Patient can continue with medications for now since they are providing benefits, using them appropriately, and without side effects. and provide Tramadol 25-50mg every 8 hours as needed for severe pain and to limit opioid use.  Tramadol 50 mg, # 21 tablets, 0 refills was provided.  I reviewed the  and is consistent patient's history and there is no aberrant drug behavior.    - Therapy:  Advised patient continue with activities and exercises as tolerated    - Psychological:  Discussed coping mechanisms to help address chronic pain issues    - Labs:  Reviewed    - Imaging: Reviewed available imaging with patient and answered any questions they had  regarding study.    - Consults/Referrals:  None at this time    - Records:  Reviewed/Obtain old records from outside physicians and imaging    - Follow up visit: return to clinic 4 weeks post procedure  -The patient's follow-up will be with our physician assistant, Giovanna Burnett PA-C.    - Counseled patient regarding the importance of activity modification and physical therapy    - This condition does not require this patient to take time off of work, and the primary goal of our Pain Management services is to improve the patient's functional capacity.    - Patient Questions: Answered all of the patient's questions regarding diagnosis, therapy, and treatment        The above plan and management options were discussed at length with patient. Patient is in agreement with the above and verbalized understanding.    I discussed the goals of interventional chronic pain management with the patient on today's visit.  I explained the utility of injections for diagnostic and therapeutic purposes.  We discussed a multimodal approach to pain including treating the patient's given worst pain at any given time.  We will use a systematic approach to addressing pain.  We will also adopt a multimodal approach that includes injections, adjuvant medications, physical therapy, at times psychiatry.  There may be a limited role for opioid use intermittently in the treatment of pain, more particularly for acute pain although no one approach can be used as a sole treatment modality.    I emphasized the importance of regular exercise, core strengthening and stretching, diet and weight loss as a cornerstone of long-term pain management.      Mao Ortiz MD  Interventional Pain Management  [facility]    Disclaimer:  This note was prepared using voice recognition system and is likely to have sound alike errors that may have been overlooked even after proof reading.  Please call me with any questions

## 2020-09-10 NOTE — PATIENT INSTRUCTIONS
Pain Management Pre-Procedure Instructions  (also available in your [employer] account)    Patient Name:___Olivia Antonio____MRN: [id number] you are scheduled to have the following procedure:__ Joint Injection  _with______Mao Ortiz MD on: _____09/15/2020__ at: [facility]    You will be contacted the day before your procedure to be given an arrival and procedure time                                                                                                            Day of Procedure   Ensure you have obtained arrival time from the Pain Management department  o We will call 48 hours in advance with your arrival time. Please check any voicemails you may have  o If you arrive past your scheduled procedure time, you may be asked to reschedule your procedure.   For your safety, ensure you have a  with you to remain present throughout your procedure   o If you arrive without a responsible adult to stay with you and drive you home, you may be asked to reschedule your procedure   Take all of your prescribed medications (exceptions noted below) with a small amount of water  o [x] Nothing by mouth after midnight the night before your procedure.  It is ok to take your regular medications with a small sip of water.     Wear loose, comfortable clothing    You may wear glasses, dentures, contact lenses and/or hearing aids. Please leave all valuable items at home.   Contact the Pain Management department at [phone] or via [employer] if you are:  o Running a fever above 100 degrees  o Feel ill, have any type of infection, or are taking antibiotics now or have in the past 2 weeks  o Have had any outpatient procedures in the past 2 weeks (colonoscopy, major dental work, etc.)  o If you are allergic to iodine, IVP dye or shellfish.      Contact Information: [phone], ask to speak to the pain management department with any questions or concerns or send a message via [employer]

## 2020-09-10 NOTE — LETTER
September 10, 2020      Jolanta Fox MD  [address] [facility] [address]           [facility]  [address] [facility] [address] [facility] [address]  Phone: [phone]  Fax: [phone]          Patient: Olivia Antonio   MR Number: [id number]   YOB: 1955   Date of Visit: 9/10/2020       Dear Dr. Jolanta Fox:    Thank you for referring Olivia Antonio to me for evaluation. Attached you will find relevant portions of my assessment and plan of care.    If you have questions, please do not hesitate to call me. I look forward to following Olivia Antonio along with you.    Sincerely,    Linda Cervantes MD    Enclosure  CC:  No Recipients    If you would like to receive this communication electronically, please contact [email or web address] or [phone] to request more information on [employer] access.    For providers and/or their staff who would like to refer a patient to [facility], please contact us through our one-stop-shop provider referral line, [facility] , at [phone].    If you feel you have received this communication in error or would no longer like to receive these types of communications, please e-mail [email or web address]

## 2020-09-10 NOTE — LETTER
September 11, 2020      Jolanta Fox MD  [address] [address] [address]           [facility]  [address] [address] [facility] [address]  Phone: [phone]  Fax: [phone]          Patient: Olivia Antonio   MR Number: [id number]   YOB: 1955   Date of Visit: 9/10/2020       Dear Dr. Jolanta Fox:    Thank you for referring Olivia Antonio to me for evaluation. Attached you will find relevant portions of my assessment and plan of care.    If you have questions, please do not hesitate to call me. I look forward to following Olivia Antonio along with you.    Sincerely,    Mao Ortiz MD    Enclosure  CC:  No Recipients    If you would like to receive this communication electronically, please contact [email or web address] or [phone] to request more information on [employer] access.    For providers and/or their staff who would like to refer a patient to [facility], please contact us through our one-stop-shop provider referral line, [facility], at [phone].    If you feel you have received this communication in error or would no longer like to receive these types of communications, please e-mail [email or web address]

## 2020-09-14 ENCOUNTER — TELEPHONE (OUTPATIENT)
Dept: PAIN MEDICINE | Facility: CLINIC | Age: 65
End: 2020-09-14

## 2020-09-14 ENCOUNTER — DOCUMENTATION ONLY (OUTPATIENT)
Dept: PREADMISSION TESTING | Facility: HOSPITAL | Age: 65
End: 2020-09-14

## 2020-09-14 RX ORDER — TRAMADOL HYDROCHLORIDE 50 MG/1
TABLET ORAL
Qty: 21 TABLET | Refills: 0 | Status: SHIPPED | OUTPATIENT
Start: 2020-09-14

## 2020-09-14 NOTE — SIGNIFICANT EVENT
The patient is scheduled for a Right SI joint injection under local anesthesia only on 9/28/20 by Dr. Ortiz at [facility]. Chart reviewed as requested by PAT nurse.   The patient is a 64 yo female with COPD/Asthma- no prior PFTs available,HTN, Smoker, Hx CVA-no residual deficits, Sacroiliitis, and Morbid Obesity -BMI 57.   On 9/1/20, the patient had a recent PCP visit for a COPD exacerbation and was placed on Nebulizers. She was referred to Pulmonology for COPD/Asthma but has not followed up yet.   The patient's procedure is planned with No IV sedation. Given the patient is Morbidly Obese and had a recent COPD exacerbation with no follow up, I would not recommend this procedure to be done with IV sedation without optimization from a Pulmonologist.

## 2020-09-14 NOTE — TELEPHONE ENCOUNTER
R/s procedure for 09/28, due to  out . Pt declined r/s with  on Thursday or Friday . Pt aske dif she can get refill of tramadol until procedure ? Informed pt I would ask one of his colleagues. Pt understood. All questions answered.   Real Lew MA  [facility]

## 2020-09-29 ENCOUNTER — PATIENT MESSAGE (OUTPATIENT)
Dept: OTHER | Facility: OTHER | Age: 65
End: 2020-09-29

## 2020-10-15 DIAGNOSIS — M25.561 CHRONIC PAIN OF BOTH KNEES: Primary | ICD-10-CM

## 2020-10-15 DIAGNOSIS — M25.562 CHRONIC PAIN OF BOTH KNEES: Primary | ICD-10-CM

## 2020-10-15 DIAGNOSIS — G89.29 CHRONIC PAIN OF BOTH KNEES: Primary | ICD-10-CM

## 2021-04-28 ENCOUNTER — PATIENT MESSAGE (OUTPATIENT)
Dept: RESEARCH | Facility: HOSPITAL | Age: 66
End: 2021-04-28

## 2021-12-14 ENCOUNTER — PATIENT OUTREACH (OUTPATIENT)
Dept: ADMINISTRATIVE | Facility: HOSPITAL | Age: 66
End: 2021-12-14
Payer: MEDICARE

## 2023-01-05 ENCOUNTER — PATIENT MESSAGE (OUTPATIENT)
Dept: RESEARCH | Facility: HOSPITAL | Age: 68
End: 2023-01-05
Payer: MEDICARE